# Patient Record
Sex: MALE | Race: WHITE | URBAN - METROPOLITAN AREA
[De-identification: names, ages, dates, MRNs, and addresses within clinical notes are randomized per-mention and may not be internally consistent; named-entity substitution may affect disease eponyms.]

---

## 2024-01-08 DIAGNOSIS — F41.9 ANXIETY: Primary | ICD-10-CM

## 2024-01-08 RX ORDER — BUSPIRONE HYDROCHLORIDE 30 MG/1
30 TABLET ORAL DAILY
COMMUNITY
End: 2024-01-08 | Stop reason: SDUPTHER

## 2024-01-08 RX ORDER — BUSPIRONE HYDROCHLORIDE 30 MG/1
30 TABLET ORAL DAILY
Qty: 90 TABLET | Refills: 1 | Status: SHIPPED | OUTPATIENT
Start: 2024-01-08 | End: 2024-02-19 | Stop reason: SDUPTHER

## 2024-02-19 ENCOUNTER — OFFICE VISIT (OUTPATIENT)
Dept: PRIMARY CARE | Facility: CLINIC | Age: 59
End: 2024-02-19
Payer: COMMERCIAL

## 2024-02-19 VITALS
TEMPERATURE: 98.1 F | HEART RATE: 85 BPM | RESPIRATION RATE: 16 BRPM | DIASTOLIC BLOOD PRESSURE: 76 MMHG | OXYGEN SATURATION: 96 % | SYSTOLIC BLOOD PRESSURE: 119 MMHG

## 2024-02-19 DIAGNOSIS — F17.220 CHEWING TOBACCO NICOTINE DEPENDENCE WITHOUT COMPLICATION: ICD-10-CM

## 2024-02-19 DIAGNOSIS — F41.9 ANXIETY: ICD-10-CM

## 2024-02-19 DIAGNOSIS — Z12.5 SPECIAL SCREENING FOR MALIGNANT NEOPLASM OF PROSTATE: ICD-10-CM

## 2024-02-19 DIAGNOSIS — G47.00 INSOMNIA W/ SLEEP APNEA: ICD-10-CM

## 2024-02-19 DIAGNOSIS — Z82.49 FAMILY HISTORY OF ISCHEMIC HEART DISEASE: ICD-10-CM

## 2024-02-19 DIAGNOSIS — Z00.00 ANNUAL PHYSICAL EXAM: Primary | ICD-10-CM

## 2024-02-19 DIAGNOSIS — E55.9 VITAMIN D DEFICIENCY: ICD-10-CM

## 2024-02-19 DIAGNOSIS — I35.1 AORTIC VALVE INSUFFICIENCY, ETIOLOGY OF CARDIAC VALVE DISEASE UNSPECIFIED: ICD-10-CM

## 2024-02-19 DIAGNOSIS — G47.30 INSOMNIA W/ SLEEP APNEA: ICD-10-CM

## 2024-02-19 DIAGNOSIS — J02.9 SORE THROAT: ICD-10-CM

## 2024-02-19 LAB — POC RAPID STREP: NEGATIVE

## 2024-02-19 PROCEDURE — 1036F TOBACCO NON-USER: CPT | Performed by: FAMILY MEDICINE

## 2024-02-19 PROCEDURE — 87880 STREP A ASSAY W/OPTIC: CPT | Performed by: FAMILY MEDICINE

## 2024-02-19 PROCEDURE — 99213 OFFICE O/P EST LOW 20 MIN: CPT | Performed by: FAMILY MEDICINE

## 2024-02-19 RX ORDER — AZITHROMYCIN 250 MG/1
TABLET, FILM COATED ORAL
Qty: 6 TABLET | Refills: 0 | Status: SHIPPED | OUTPATIENT
Start: 2024-02-19 | End: 2024-02-24

## 2024-02-19 RX ORDER — BUSPIRONE HYDROCHLORIDE 30 MG/1
30 TABLET ORAL DAILY
Qty: 90 TABLET | Refills: 1 | Status: SHIPPED | OUTPATIENT
Start: 2024-02-19

## 2024-02-19 NOTE — LETTER
February 19, 2024     Patient: Kelvin Hdez   YOB: 1965   Date of Visit: 2/19/2024       To Whom It May Concern:    Kelvin Hdez was seen in my clinic on 2/19/2024 at 11:20 am. Please excuse Kelvin for his absence from work on this day to make the appointment.    If you have any questions or concerns, please don't hesitate to call.         Sincerely,         Carl Harrell MD        CC: No Recipients

## 2024-02-19 NOTE — PATIENT INSTRUCTIONS
1--get yearly labs completed  2--call for concerns [phone].    3--EXCUSE FROM WORK FOR MEDICAL NEED:  RETURN TO WORK: 2/21/2024.

## 2024-02-19 NOTE — PROGRESS NOTES
Subjective   Patient ID: Kelvin Hdez is a 58 y.o. male who presents for URI (Sore throat, runny nose, cough /).  DR BENZ PT.   AWOKE SUNDAY W SOSA MARTE RN.  CLEARING HIS THROAT.   FLU SHOT: NOT DONE.    COVID BOOSTERS:  NO  NO RSV.      URI         Review of Systems   All other systems reviewed and are negative.      Objective   Physical Exam  Vitals and nursing note reviewed.   Constitutional:       Appearance: Normal appearance.   HENT:      Head: Normocephalic.      Right Ear: Ear canal and external ear normal.      Left Ear: Ear canal and external ear normal.      Ears:      Comments: Red left tm and fluid distends rt tm.       Nose: Nose normal.      Mouth/Throat:      Mouth: Mucous membranes are moist.   Eyes:      Conjunctiva/sclera: Conjunctivae normal.      Pupils: Pupils are equal, round, and reactive to light.   Cardiovascular:      Rate and Rhythm: Normal rate and regular rhythm.      Pulses: Normal pulses.      Heart sounds: Normal heart sounds.   Pulmonary:      Effort: Pulmonary effort is normal.      Breath sounds: Normal breath sounds.   Abdominal:      General: Bowel sounds are normal.      Palpations: Abdomen is soft.   Musculoskeletal:         General: Normal range of motion.      Cervical back: Normal range of motion and neck supple.   Skin:     General: Skin is warm and dry.   Neurological:      General: No focal deficit present.   Psychiatric:         Mood and Affect: Mood normal.         Behavior: Behavior normal.         Assessment/Plan   Diagnoses and all orders for this visit:  Annual physical exam  -     Lipid Panel; Future  Sore throat  -     POCT Rapid Strep A manually resulted  -     Follow Up In Primary Care - Established; Future  -     azithromycin (Zithromax) 250 mg tablet; Take 2 tablets (500 mg) by mouth once daily for 1 day, THEN 1 tablet (250 mg) once daily for 4 days. Take 2 tabs (500 mg) by mouth today, than 1 daily for 4 days..  Special screening for malignant  neoplasm of prostate  -     Prostate Specific Antigen, Screen; Future  Vitamin D deficiency  -     Vitamin D 25-Hydroxy,Total (for eval of Vitamin D levels); Future  Family history of ischemic heart disease  -     Lipid Panel; Future  -     Prostate Specific Antigen, Screen; Future  -     Vitamin D 25-Hydroxy,Total (for eval of Vitamin D levels); Future  -     Urinalysis with Reflex Microscopic; Future  -     TSH with reflex to Free T4 if abnormal; Future  -     Comprehensive Metabolic Panel; Future  -     CBC and Auto Differential; Future  Anxiety  -     Lipid Panel; Future  -     Prostate Specific Antigen, Screen; Future  -     Vitamin D 25-Hydroxy,Total (for eval of Vitamin D levels); Future  -     Urinalysis with Reflex Microscopic; Future  -     TSH with reflex to Free T4 if abnormal; Future  -     Comprehensive Metabolic Panel; Future  -     CBC and Auto Differential; Future  Insomnia w/ sleep apnea  Aortic valve insufficiency, etiology of cardiac valve disease unspecified  -     Lipid Panel; Future  -     Prostate Specific Antigen, Screen; Future  -     Vitamin D 25-Hydroxy,Total (for eval of Vitamin D levels); Future  -     Urinalysis with Reflex Microscopic; Future  -     TSH with reflex to Free T4 if abnormal; Future  -     Comprehensive Metabolic Panel; Future  -     CBC and Auto Differential; Future             .VS

## 2024-05-02 ENCOUNTER — APPOINTMENT (OUTPATIENT)
Dept: PRIMARY CARE | Facility: CLINIC | Age: 59
End: 2024-05-02
Payer: COMMERCIAL

## 2024-10-22 ENCOUNTER — OFFICE VISIT (OUTPATIENT)
Dept: PRIMARY CARE | Facility: CLINIC | Age: 59
End: 2024-10-22
Payer: COMMERCIAL

## 2024-10-22 VITALS
WEIGHT: 219 LBS | TEMPERATURE: 98 F | HEART RATE: 90 BPM | DIASTOLIC BLOOD PRESSURE: 76 MMHG | BODY MASS INDEX: 28.89 KG/M2 | RESPIRATION RATE: 16 BRPM | SYSTOLIC BLOOD PRESSURE: 124 MMHG

## 2024-10-22 DIAGNOSIS — R07.9 CHEST PAIN, UNSPECIFIED TYPE: Primary | ICD-10-CM

## 2024-10-22 DIAGNOSIS — L73.9 FOLLICULITIS: ICD-10-CM

## 2024-10-22 PROCEDURE — 99214 OFFICE O/P EST MOD 30 MIN: CPT | Performed by: FAMILY MEDICINE

## 2024-10-22 PROCEDURE — 1036F TOBACCO NON-USER: CPT | Performed by: FAMILY MEDICINE

## 2024-10-22 RX ORDER — DOXYCYCLINE 100 MG/1
100 CAPSULE ORAL 2 TIMES DAILY
Qty: 14 CAPSULE | Refills: 0 | Status: SHIPPED | OUTPATIENT
Start: 2024-10-22 | End: 2024-10-29

## 2024-10-22 RX ORDER — TRIAMCINOLONE ACETONIDE 1 MG/G
CREAM TOPICAL 2 TIMES DAILY
Qty: 30 G | Refills: 0 | Status: SHIPPED | OUTPATIENT
Start: 2024-10-22

## 2024-10-22 RX ORDER — CYCLOBENZAPRINE HCL 5 MG
1 TABLET ORAL
COMMUNITY
Start: 2024-06-13

## 2024-10-22 NOTE — LETTER
October 22, 2024     Patient: Kelvin Hdez   YOB: 1965   Date of Visit: 10/22/2024       To Whom It May Concern:    Kelvin Hdez was seen in my clinic on 10/22/2024 . Please excuse Kelvin for his absence from work for this day due to medical concern.     If you have any questions or concerns, please don't hesitate to call.         Sincerely,         Alejandro Brown MD        CC: No Recipients

## 2024-10-22 NOTE — PROGRESS NOTES
Subjective   Patient ID: Kelvin Hdez is a 58 y.o. male who presents for Chest Pain (Since last night ).  HPI  Patient had a dream that he was having chest pain , he woke up and was not sure if it is was a dream or reality.   The pain went away   He exercised this morning  Sleeping , was dreaming of chest pain , he woke up , was having that feeling, went to the bathroom and get a glass of water and the pain went away.   He is not sure that he just dreamed it , he thinks he was having the feeling.   Today he is feeling ok , he is sleepy , he wears a CPAP .   No issues with the CPAP.    He follows with cardiology Meme Salazar , he goes there to monitor aorta level , he has atrial aneurysm , family hx of aneurysm.     Review of Systems    Past Medical History:   Diagnosis Date    Personal history of other diseases of the musculoskeletal system and connective tissue     History of arthritis    Personal history of other diseases of the nervous system and sense organs     History of sleep apnea    Personal history of other mental and behavioral disorders     History of depression       Past Surgical History:   Procedure Laterality Date    OTHER SURGICAL HISTORY  10/02/2020    Cataract surgery    OTHER SURGICAL HISTORY  10/26/2020    Small bowel resection    OTHER SURGICAL HISTORY  10/26/2020    Hand fracture repair    OTHER SURGICAL HISTORY  10/26/2020    Knee arthroscopy    OTHER SURGICAL HISTORY  12/22/2022    Sigmoidectomy      Social History     Socioeconomic History    Marital status:    Tobacco Use    Smoking status: Former     Types: Cigarettes    Smokeless tobacco: Never    Tobacco comments:     Pt quit smoking 30 yrs ago but daily.     Substance and Sexual Activity    Alcohol use: Never    Drug use: Never      No family history on file.    MEDICATIONS AND ALLERGIES:    ALLERGIES Patient has no known allergies.    MEDICATIONS   Current Outpatient Medications on File Prior to Visit   Medication Sig  "Dispense Refill    busPIRone (Buspar) 30 mg tablet Take 1 tablet (30 mg) by mouth once daily. 90 tablet 1    cyclobenzaprine (Flexeril) 5 mg tablet Take 1 tablet (5 mg) by mouth every 12 hours.       No current facility-administered medications on file prior to visit.              Objective   Visit Vitals  /76 (BP Location: Right arm, Patient Position: Sitting, BP Cuff Size: Large adult)   Pulse 90   Temp 36.7 °C (98 °F) (Temporal)   Resp 16   Wt 99.3 kg (219 lb)   BMI 28.89 kg/m²   Smoking Status Former   BSA 2.26 m²          10/26/2020     9:15 AM 2/19/2024    11:23 AM 10/22/2024     1:07 PM   Vitals   Systolic 120 119 124   Diastolic 76 76 76   Heart Rate 80 85 90   Temp 36.5 °C (97.7 °F) 36.7 °C (98.1 °F) 36.7 °C (98 °F)   Resp  16 16   Height (in) 1.854 m (6' 1\")     Weight (lb) 211  219   BMI 27.84 kg/m2  28.89 kg/m2   BSA (m2) 2.22 m2  2.26 m2   Visit Report  Report Report     Physical Exam    Today did not seem in distress   Heart and lungs sounds are normal     Assessment & Plan  Chest pain, unspecified type  EKG today without acute findings   Advised to go to the ED if it happens again , we explained risk of missing a cardiac event that can lead to disability or death .   Will order stress test   Advised to avoid strenuous activity until further notice.       Orders:    Stress Test; Future    Folliculitis  On the elbow, steroid cream did not help   Will treat with doscycylin and triamcinolone.     Orders:    doxycycline (Vibramycin) 100 mg capsule; Take 1 capsule (100 mg) by mouth 2 times a day for 7 days. Take with at least 8 ounces (large glass) of water, do not lie down for 30 minutes after    triamcinolone (Kenalog) 0.1 % cream; Apply topically 2 times a day. Apply to affected area 1-2 times daily as needed. Avoid face and groin.             "

## 2024-11-07 ENCOUNTER — TELEPHONE (OUTPATIENT)
Dept: CARDIOLOGY | Facility: CLINIC | Age: 59
End: 2024-11-07
Payer: COMMERCIAL

## 2024-11-12 ENCOUNTER — APPOINTMENT (OUTPATIENT)
Dept: CARDIOLOGY | Facility: CLINIC | Age: 59
End: 2024-11-12
Payer: COMMERCIAL

## 2025-01-23 ENCOUNTER — APPOINTMENT (OUTPATIENT)
Dept: PRIMARY CARE | Facility: CLINIC | Age: 60
End: 2025-01-23
Payer: COMMERCIAL

## 2025-01-23 VITALS
WEIGHT: 226 LBS | DIASTOLIC BLOOD PRESSURE: 82 MMHG | TEMPERATURE: 97.7 F | RESPIRATION RATE: 16 BRPM | SYSTOLIC BLOOD PRESSURE: 128 MMHG | BODY MASS INDEX: 29.82 KG/M2 | HEART RATE: 82 BPM

## 2025-01-23 DIAGNOSIS — H92.02 LEFT EAR PAIN: Primary | ICD-10-CM

## 2025-01-23 DIAGNOSIS — F41.9 ANXIETY: ICD-10-CM

## 2025-01-23 DIAGNOSIS — L73.9 FOLLICULITIS: ICD-10-CM

## 2025-01-23 PROCEDURE — 1036F TOBACCO NON-USER: CPT | Performed by: FAMILY MEDICINE

## 2025-01-23 PROCEDURE — 99214 OFFICE O/P EST MOD 30 MIN: CPT | Performed by: FAMILY MEDICINE

## 2025-01-23 RX ORDER — LORAZEPAM 0.5 MG/1
0.5 TABLET ORAL 2 TIMES DAILY PRN
Qty: 30 TABLET | Refills: 0 | Status: SHIPPED | OUTPATIENT
Start: 2025-01-23 | End: 2025-04-23

## 2025-01-23 RX ORDER — BUSPIRONE HYDROCHLORIDE 30 MG/1
30 TABLET ORAL DAILY
Qty: 90 TABLET | Refills: 1 | Status: SHIPPED | OUTPATIENT
Start: 2025-01-23

## 2025-01-23 RX ORDER — TRIAMCINOLONE ACETONIDE 1 MG/G
CREAM TOPICAL 2 TIMES DAILY
Qty: 30 G | Refills: 2 | Status: SHIPPED | OUTPATIENT
Start: 2025-01-23

## 2025-01-23 RX ORDER — OFLOXACIN 3 MG/ML
5 SOLUTION AURICULAR (OTIC) 2 TIMES DAILY
Qty: 5 ML | Refills: 0 | Status: SHIPPED | OUTPATIENT
Start: 2025-01-23 | End: 2025-01-30

## 2025-01-23 RX ORDER — LORAZEPAM 0.5 MG/1
TABLET ORAL
COMMUNITY
End: 2025-01-23 | Stop reason: DRUGHIGH

## 2025-01-23 NOTE — PROGRESS NOTES
Subjective   Patient ID: Kelvin Hdez is a 59 y.o. male who presents for Medication Visit, Earache (Left ear ), and Rash (Bumps on left arm ).  HPI  Patient here for follow up no chest pain   Ear ache   8 month rash , contact dentist       Does not drink alcohol     He does not drink alcohol    Car .     Takes lorazepam PRN   Last refilled 12/2023 from the ED   Denied drinking alcohol or using drugs.     Review of Systems    Past Medical History:   Diagnosis Date    Personal history of other diseases of the musculoskeletal system and connective tissue     History of arthritis    Personal history of other diseases of the nervous system and sense organs     History of sleep apnea    Personal history of other mental and behavioral disorders     History of depression       Past Surgical History:   Procedure Laterality Date    OTHER SURGICAL HISTORY  10/02/2020    Cataract surgery    OTHER SURGICAL HISTORY  10/26/2020    Small bowel resection    OTHER SURGICAL HISTORY  10/26/2020    Hand fracture repair    OTHER SURGICAL HISTORY  10/26/2020    Knee arthroscopy    OTHER SURGICAL HISTORY  12/22/2022    Sigmoidectomy      Social History     Socioeconomic History    Marital status:    Tobacco Use    Smoking status: Former     Types: Cigarettes    Smokeless tobacco: Never    Tobacco comments:     Pt quit smoking 30 yrs ago but daily.     Substance and Sexual Activity    Alcohol use: Never    Drug use: Never      No family history on file.    MEDICATIONS AND ALLERGIES:    ALLERGIES Patient has no known allergies.    MEDICATIONS   Current Outpatient Medications on File Prior to Visit   Medication Sig Dispense Refill    cyclobenzaprine (Flexeril) 5 mg tablet Take 1 tablet (5 mg) by mouth every 12 hours.      [DISCONTINUED] busPIRone (Buspar) 30 mg tablet Take 1 tablet (30 mg) by mouth once daily. 90 tablet 1    [DISCONTINUED] LORazepam (Ativan) 0.5 mg tablet Take by mouth.      [DISCONTINUED]  "triamcinolone (Kenalog) 0.1 % cream Apply topically 2 times a day. Apply to affected area 1-2 times daily as needed. Avoid face and groin. 30 g 0     No current facility-administered medications on file prior to visit.              Objective   Visit Vitals  /82   Pulse 82   Temp 36.5 °C (97.7 °F) (Temporal)   Resp 16   Wt 103 kg (226 lb)   BMI 29.82 kg/m²   Smoking Status Former   BSA 2.3 m²          10/26/2020     9:15 AM 2/19/2024    11:23 AM 10/22/2024     1:07 PM 1/23/2025     8:50 AM 1/23/2025     9:11 AM   Vitals   Systolic 120 119 124 149 128   Diastolic 76 76 76 83 82   BP Location   Right arm Left arm    Heart Rate 80 85 90 82    Temp 36.5 °C (97.7 °F) 36.7 °C (98.1 °F) 36.7 °C (98 °F) 36.5 °C (97.7 °F)    Resp  16 16 16    Height 1.854 m (6' 1\")       Weight (lb) 211  219 226    BMI 27.84 kg/m2  28.89 kg/m2 29.82 kg/m2    BSA (m2) 2.22 m2  2.26 m2 2.3 m2    Visit Report  Report Report Report Report     Physical Exam        Assessment & Plan  Anxiety  Will refill buspar   Had long discussion regarding lorazepam   Controlled substance agreement signed   Will prescibre 30 tablets , for 1 year   Side effects includes but not only drowsiness, constipation, nausea, itching, vomiting, dizziness, allergic reaction, inability to urinate, low blood pressure, irregular heartbeat, insomnia, depression, sexual dysfunction, impotence, slowing of breathing rate, slowing of reflexes or reaction time, physical and emotional dependence, tolerance to pain medications, addiction and death. It is unsafe to drive or operate machinery after taking these medications   Advised not to take with alcohol or other recreational drugs.   Orders:    busPIRone (Buspar) 30 mg tablet; Take 1 tablet (30 mg) by mouth once daily.    ofloxacin (Floxin) 0.3 % otic solution; Administer 5 drops into the left ear 2 times a day for 7 days.    LORazepam (Ativan) 0.5 mg tablet; Take 1 tablet (0.5 mg) by mouth 2 times a day as needed for " anxiety.    Folliculitis  Vs dermatitis will treat with kerosendo   Advised to follow with his dermatologist.   Orders:    triamcinolone (Kenalog) 0.1 % cream; Apply topically 2 times a day. Apply to affected area 1-2 times daily as needed. Avoid face and groin.    ofloxacin (Floxin) 0.3 % otic solution; Administer 5 drops into the left ear 2 times a day for 7 days.    LORazepam (Ativan) 0.5 mg tablet; Take 1 tablet (0.5 mg) by mouth 2 times a day as needed for anxiety.    Left ear pain  Will start ofloxacin otic soluion   Alarming signs   Instructed pt to contact clinic is symptoms fail to improve or to return to clinic earlier if symptoms worsen   Counseled pt on warning signs of when to present to ER, they verbalized understanding.

## 2025-05-02 DIAGNOSIS — F41.9 ANXIETY: ICD-10-CM

## 2025-05-02 RX ORDER — BUSPIRONE HYDROCHLORIDE 30 MG/1
30 TABLET ORAL DAILY
Qty: 90 TABLET | Refills: 1 | Status: SHIPPED | OUTPATIENT
Start: 2025-05-02

## 2025-07-28 DIAGNOSIS — F41.9 ANXIETY: ICD-10-CM

## 2025-07-28 RX ORDER — BUSPIRONE HYDROCHLORIDE 30 MG/1
30 TABLET ORAL DAILY
Qty: 90 TABLET | Refills: 0 | Status: SHIPPED | OUTPATIENT
Start: 2025-07-28

## 2025-08-18 ENCOUNTER — TELEPHONE (OUTPATIENT)
Dept: CARDIOLOGY | Facility: CLINIC | Age: 60
End: 2025-08-18
Payer: COMMERCIAL

## 2025-08-19 ENCOUNTER — APPOINTMENT (OUTPATIENT)
Dept: PRIMARY CARE | Facility: CLINIC | Age: 60
End: 2025-08-19
Payer: COMMERCIAL

## 2025-08-19 VITALS
SYSTOLIC BLOOD PRESSURE: 134 MMHG | BODY MASS INDEX: 29 KG/M2 | TEMPERATURE: 97.5 F | DIASTOLIC BLOOD PRESSURE: 86 MMHG | HEIGHT: 73 IN | WEIGHT: 218.8 LBS | RESPIRATION RATE: 18 BRPM | HEART RATE: 94 BPM | OXYGEN SATURATION: 99 %

## 2025-08-19 DIAGNOSIS — R00.2 PALPITATIONS: ICD-10-CM

## 2025-08-19 DIAGNOSIS — M51.360 LUMBAR DISCOGENIC PAIN SYNDROME: ICD-10-CM

## 2025-08-19 DIAGNOSIS — R00.2 PALPITATIONS: Primary | ICD-10-CM

## 2025-08-19 DIAGNOSIS — F41.9 ANXIETY: ICD-10-CM

## 2025-08-19 DIAGNOSIS — Z00.00 ANNUAL PHYSICAL EXAM: Primary | ICD-10-CM

## 2025-08-19 LAB
CHOLEST SERPL-MCNC: 220 MG/DL
CHOLEST/HDLC SERPL: 3.5 (CALC)
HDLC SERPL-MCNC: 63 MG/DL
LDLC SERPL CALC-MCNC: 139 MG/DL (CALC)
NONHDLC SERPL-MCNC: 157 MG/DL (CALC)
PSA SERPL-MCNC: 1.28 NG/ML
TRIGL SERPL-MCNC: 84 MG/DL

## 2025-08-19 PROCEDURE — 99396 PREV VISIT EST AGE 40-64: CPT | Performed by: INTERNAL MEDICINE

## 2025-08-19 PROCEDURE — 3008F BODY MASS INDEX DOCD: CPT | Performed by: INTERNAL MEDICINE

## 2025-08-19 PROCEDURE — 99214 OFFICE O/P EST MOD 30 MIN: CPT | Performed by: INTERNAL MEDICINE

## 2025-08-19 RX ORDER — METHYLPREDNISOLONE 4 MG/1
TABLET ORAL
Qty: 21 TABLET | Refills: 0 | Status: SHIPPED | OUTPATIENT
Start: 2025-08-19 | End: 2025-08-25

## 2025-08-19 RX ORDER — IBUPROFEN 600 MG/1
600 TABLET, FILM COATED ORAL 3 TIMES DAILY PRN
Qty: 60 TABLET | Refills: 0 | Status: SHIPPED | OUTPATIENT
Start: 2025-08-19 | End: 2025-10-18

## 2025-08-19 ASSESSMENT — ENCOUNTER SYMPTOMS
WEAKNESS: 0
FATIGUE: 0
ADENOPATHY: 0
BACK PAIN: 1
ALLERGIC/IMMUNOLOGIC NEGATIVE: 1
BLOOD IN STOOL: 0
ARTHRALGIAS: 0
AGITATION: 0
NUMBNESS: 0
CONSTIPATION: 0
ENDOCRINE NEGATIVE: 1
PALPITATIONS: 1
ABDOMINAL PAIN: 0
DIZZINESS: 0
SHORTNESS OF BREATH: 0
EYE REDNESS: 0
FEVER: 0
COUGH: 0
WHEEZING: 0
DYSURIA: 0
HEADACHES: 0
JOINT SWELLING: 0
NERVOUS/ANXIOUS: 1
FREQUENCY: 0
ACTIVITY CHANGE: 0

## 2025-08-19 ASSESSMENT — PATIENT HEALTH QUESTIONNAIRE - PHQ9
1. LITTLE INTEREST OR PLEASURE IN DOING THINGS: NOT AT ALL
SUM OF ALL RESPONSES TO PHQ9 QUESTIONS 1 AND 2: 0
2. FEELING DOWN, DEPRESSED OR HOPELESS: NOT AT ALL

## 2025-08-25 ENCOUNTER — HOSPITAL ENCOUNTER (OUTPATIENT)
Dept: CARDIOLOGY | Facility: CLINIC | Age: 60
Discharge: HOME | End: 2025-08-25
Payer: COMMERCIAL

## 2025-08-25 DIAGNOSIS — R00.2 PALPITATIONS: ICD-10-CM

## 2025-08-25 PROCEDURE — 93246 EXT ECG>7D<15D RECORDING: CPT

## 2025-10-13 ENCOUNTER — APPOINTMENT (OUTPATIENT)
Dept: CARDIOLOGY | Facility: CLINIC | Age: 60
End: 2025-10-13
Payer: COMMERCIAL